# Patient Record
(demographics unavailable — no encounter records)

---

## 2024-11-25 NOTE — CONSULT LETTER
[Dear  ___] : Dear  [unfilled], [Consult Letter:] : I had the pleasure of evaluating your patient, [unfilled]. [Please see my note below.] : Please see my note below. [Consult Closing:] : Thank you very much for allowing me to participate in the care of this patient.  If you have any questions, please do not hesitate to contact me. [Sincerely,] : Sincerely, [FreeTextEntry3] : Sujit Harry MD FCCP Pulmonary/Critical Care/Sleep Medicine Department of Internal Medicine  [facility]

## 2024-11-25 NOTE — HISTORY OF PRESENT ILLNESS
[Never] : never [Home] : home [APAP:] : APAP [Mild Persistent] : mild persistent [Doing Well] : doing well [Well Controlled] : Well controlled [None] : The patient is currently asymptomatic [___ Times a Week] : of [unfilled] time(s) a week ["Doesn't Seem To Help"] : belief that the medication is not helping [TextBox_4] : 52-year-old female with a history of right bundle branch block, mild persistent asthma seen today for sleep evaluation as well as evaluation of underlying asthma [Awakes Unrefreshed] : does not awaken unrefreshed [Awakes with Dry Mouth] : does not awaken with dry mouth [Awakes with Headache] : does not awaken with headache [Daytime Somnolence] : denies daytime somnolence [Difficulty Maintaining Sleep] : does not have difficulty maintaining sleep [Snoring] : no snoring [Witnessed Apneas] : no witnessed apneas [Unusual Sleep Behavior] : no unusual sleep behavior [TextBox_77] : 9908 [TextBox_79] : 6423 [TextBox_81] : 2 [TextBox_83] : 0 [TextBox_89] : 1 [TextBox_100] : 7/19/2024 [TextBox_108] : 37.1 [TextBox_112] : 1.5 [TextBox_116] : 85 [TextBox_120] : TST 32min, supine index 62.6 [TextBox_125] : 4-16 [TextBox_127] : 10- [TextBox_129] : 11/17/2024 [TextBox_133] : 93 [TextBox_137] : 50 [TextBox_141] : 4 [TextBox_143] : 13 [TextBox_147] : 0.9 [TextBox_158] : Pablo [TextBox_162] : 10/4/2024 [TextBox_165] : Pavg 8.8 cm H2O, P95% 11.0 cm H2O [ESS] : 2 [More Frequent Use Needed Recently] : normal [Adherent] : the patient is not adherent with ~his/her~ medication regimen

## 2024-11-25 NOTE — DISCUSSION/SUMMARY
[Obstructive Sleep Apnea] : obstructive sleep apnea [Severe] : severe [Alcohol Avoidance] : alcohol avoidance [Sedative Avoidance] : sedative avoidance [Weight Loss Program] : weight loss program [Asthma] : asthma [Immunotherapy (Desensitization)] : immunotherapy (allergen desensitization) [Patient] : the patient [Improving] : improving [Responding to Treatment] : responding to treatment [Decompensated] : decompensated [Not Responding to Treatment] : not responding to treatment [Moderate Persistent] : moderate persistent [Medication Changes Per Orders] : Medication changes are as documented in orders [de-identified] : Compliance affected by unconscious removal of mask during the night [de-identified] : Suggested that the patient showed off his automatic auto off option on his machine.  This within probably cause him to awaken and put the mask back on [de-identified] : Samples provided

## 2025-02-18 NOTE — DISCUSSION/SUMMARY
[Obstructive Sleep Apnea] : obstructive sleep apnea [Severe] : severe [Alcohol Avoidance] : alcohol avoidance [Sedative Avoidance] : sedative avoidance [Weight Loss Program] : weight loss program [Asthma] : asthma [Not Responding to Treatment] : not responding to treatment [Moderate Persistent] : moderate persistent [Medication Changes Per Orders] : Medication changes are as documented in orders [Immunotherapy (Desensitization)] : immunotherapy (allergen desensitization) [Patient] : discussed with the patient [Improving] : improving [Responding to Treatment] : responding to treatment [de-identified] : encourage better compliance

## 2025-02-18 NOTE — HISTORY OF PRESENT ILLNESS
[Never] : never [Home] : home [APAP:] : APAP [Moderate Persistent] : moderate persistent [Doing Well] : doing well [Well Controlled] : Well controlled [None] : The patient is currently asymptomatic [Obstructive Sleep Apnea] : obstructive sleep apnea ["Doesn't Seem To Help"] : belief that the medication is not helping [TextBox_4] : 52-year-old female with a history of right bundle branch block, moderate  persistent asthma seen today for sleep evaluation as well as evaluation of underlying asthma [Awakes Unrefreshed] : does not awaken unrefreshed [Awakes with Dry Mouth] : does not awaken with dry mouth [Awakes with Headache] : does not awaken with headache [Daytime Somnolence] : denies daytime somnolence [Difficulty Maintaining Sleep] : does not have difficulty maintaining sleep [Snoring] : no snoring [Witnessed Apneas] : no witnessed apneas [Unusual Sleep Behavior] : no unusual sleep behavior [TextBox_77] : 6036 [TextBox_79] : 8203 [TextBox_81] : 2 [TextBox_83] : 0 [TextBox_89] : 1 [TextBox_100] : 7/19/2024 [TextBox_108] : 37.1 [TextBox_112] : 1.5 [TextBox_116] : 85 [TextBox_120] : TST 32min, supine index 62.6 [TextBox_125] : 4-16 [TextBox_127] : 1/19/2025 [TextBox_129] : 2/17/2025 [TextBox_133] : 57 [TextBox_137] : 43 [TextBox_141] : 5 [TextBox_143] : 41 [TextBox_147] : 1.2 [TextBox_158] : Pablo [TextBox_162] : 10/4/2024 [TextBox_165] : P avg 7.8cm H20 [ESS] : 1 [More Frequent Use Needed Recently] : normal [Adherent] : the patient is not adherent with ~his/her~ medication regimen [FreeTextEntry3] : using trelegy once or twice a week

## 2025-02-18 NOTE — PROCEDURE
[FreeTextEntry1] : 2/18/2025: Spirometry-forced vital capacity 2.72 (71%), FEV1 1.82 L (59%), FEV1/FVC 67%.  When compared to previous values of 11/25/2024 there has been an improvement by 600 cc in forced vital capacity and approximately 300 cc in FEV1.

## 2025-02-18 NOTE — CONSULT LETTER
[Dear  ___] : Dear  [unfilled], [Consult Letter:] : I had the pleasure of evaluating your patient, [unfilled]. [Please see my note below.] : Please see my note below. [Consult Closing:] : Thank you very much for allowing me to participate in the care of this patient.  If you have any questions, please do not hesitate to contact me. [Sincerely,] : Sincerely, [FreeTextEntry3] : Sujit Harry MD FCCP Pulmonary/Critical Care/Sleep Medicine Department of Internal Medicine  [facility]